# Patient Record
Sex: MALE | Race: WHITE
[De-identification: names, ages, dates, MRNs, and addresses within clinical notes are randomized per-mention and may not be internally consistent; named-entity substitution may affect disease eponyms.]

---

## 2018-10-20 ENCOUNTER — HOSPITAL ENCOUNTER (EMERGENCY)
Dept: HOSPITAL 63 - ER | Age: 73
Discharge: HOME | End: 2018-10-20
Payer: MEDICARE

## 2018-10-20 VITALS — HEIGHT: 73 IN | WEIGHT: 165 LBS | BODY MASS INDEX: 21.87 KG/M2

## 2018-10-20 VITALS — DIASTOLIC BLOOD PRESSURE: 81 MMHG | SYSTOLIC BLOOD PRESSURE: 153 MMHG
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
## 2018-10-20 DIAGNOSIS — S61.111A: Primary | ICD-10-CM
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
## 2018-10-20 PROCEDURE — 90715 TDAP VACCINE 7 YRS/> IM: CPT

## 2018-10-20 PROCEDURE — 12001 RPR S/N/AX/GEN/TRNK 2.5CM/<: CPT

## 2018-10-20 PROCEDURE — 73140 X-RAY EXAM OF FINGER(S): CPT

## 2018-10-20 PROCEDURE — 90471 IMMUNIZATION ADMIN: CPT

## 2018-10-20 NOTE — RAD
FINGER(S) RIGHT (hand PA and lateral, first digit oblique)

 

INDICATION:  right thumb injury, cut thumb working around the house

 

COMPARISON:  None.

 

FINDINGS:  

 

No acute fracture or malalignment. Advanced first carpometacarpal 

arthrosis. Triangular fibrocartilage complex chondrocalcinosis. Bony 

mineralization is normal for the patient's age. No significant soft tissue

abnormality. Fourth digit jewelry. No other radiopaque foreign body.

 

IMPRESSION:  

1. No acute fracture or malalignment.

2. Fourth digit jewelry. No other radiopaque foreign body.

3. Advanced first carpometacarpal arthrosis.

4. Triangular fibrocartilage complex chondrocalcinosis which can be seen 

with CPPD.

 

Electronically signed by: Jelani Oliver MD (10/20/2018 1:03 PM) 

[facility]

## 2018-10-20 NOTE — PHYS DOC
Past History


Past Medical History:  No Pertinent History


Past Surgical History:  No Surgical History


Smoking:  Non-smoker


Alcohol Use:  Rarely


Drug Use:  None





Adult General


Chief Complaint


Chief Complaint:  FINGER INJURY





HPI


HPI





Patient is a 73 year old right-handed male who presents with complaining of 

injury to right thumb. Patient states he was fixing his garage door and 

discussing in his right thumb laceration and bleeding of the right thumb. 

Patient denies other injuries and focal neuro deficit. Last tetanus 

immunization is unknown.





Review of Systems


Review of Systems





Constitutional: Denies fever or chills []


Eyes: Denies change in visual acuity, redness, or eye pain []


HENT: Denies nasal congestion or sore throat []


Respiratory: Denies cough or shortness of breath []


Cardiovascular: No additional information not addressed in HPI []


GI: Denies abdominal pain, nausea, vomiting, bloody stools or diarrhea []


: Denies dysuria or hematuria []


Musculoskeletal: Denies back pain, reports joint pain []


Integument: Denies rash or skin lesions []


Neurologic: Denies headache, focal weakness or sensory changes []


Endocrine: Denies polyuria or polydipsia []





All other systems were reviewed and found to be within normal limits, except as 

documented in this note.





Current Medications


Current Medications





Current Medications








 Medications


  (Trade)  Dose


 Ordered  Sig/Yaritza  Start Time


 Stop Time Status Last Admin


Dose Admin


 


 Diphtheria/


 Tetanus/Acell


 Pertussis


  (Boostrix)  0.5 ml  ONCE ONCE  10/20/18 13:15


 10/20/18 13:16 UNV  














Physical Exam


Physical Exam





Constitutional: Well developed, well nourished, mild distress, non-toxic 

appearance. []


HENT: Normocephalic, atraumatic.


Eyes: PERRLA, EOMI, conjunctiva normal, no discharge. [] 


Neck: Normal range of motion, no tenderness, supple, no stridor. [] 


Cardiovascular:Heart rate regular rhythm, no murmur []


Lungs & Thorax:  Bilateral breath sounds clear to auscultation []


Back: No tenderness, no CVA tenderness. [] 


Extremities: Right thumb with 1 cm longitudinal laceration of distal phalanx 

dorsal side with extension to need with subungual hematoma without 

neurovascular deficit,mild tenderness, no cyanosis, no clubbing, ROM intact, no 

edema. [] 


Neurologic: Alert and oriented X 3, normal motor function, normal sensory 

function, no focal deficits noted. []


Psychologic: Affect normal, judgement normal, mood normal. []





Current Patient Data


Vital Signs





 Vital Signs








  Date Time  Temp Pulse Resp B/P (MAP) Pulse Ox O2 Delivery O2 Flow Rate FiO2


 


10/20/18 12:33 98.4 68 16  98 Room Air  











EKG


EKG


[]





Radiology/Procedures


Radiology/Procedures


 [facility] [address], [place] [address]


 [phone]


 


 IMAGING REPORT





 Signed





PATIENT: NICKY GALO ACCOUNT: [id number] MRN#: [id number]


: 1945 LOCATION: ER AGE: 73


SEX: M EXAM DT: 10/20/18 ACCESSION#: [id number]


STATUS: REG ER ORD. PHYSICIAN: LUH PALMA MD 


REASON: thumb injury


PROCEDURE: FINGER(S) RIGHT





 


FINGER(S) RIGHT (hand PA and lateral, first digit oblique)


 


INDICATION:  right thumb injury, cut thumb working around the house


 


COMPARISON:  None.


 


FINDINGS:  


 


No acute fracture or malalignment. Advanced first carpometacarpal 


arthrosis. Triangular fibrocartilage complex chondrocalcinosis. Bony 


mineralization is normal for the patient's age. No significant soft tissue


abnormality. Fourth digit jewelry. No other radiopaque foreign body.


 


IMPRESSION:  


1. No acute fracture or malalignment.


2. Fourth digit jewelry. No other radiopaque foreign body.


3. Advanced first carpometacarpal arthrosis.


4. Triangular fibrocartilage complex chondrocalcinosis which can be seen 


with CPPD.


 


Electronically signed by: Jelani Oliver MD (10/20/2018 1:03 PM) 


[facility]














DICTATED AND SIGNED BY:     JELANI OLIVER MD


DATE:     10/20/18 9009





CC: LUH PALMA MD; MARQUEZ MENJIVAR MD ~





Course & Med Decision Making


Course & Med Decision Making


Pertinent  Imaging studies reviewed. (See chart for details)





discharge:





I've spoken with the patient and/or caregivers. I've explained the patient's 

condition, diagnosis and treatment plan based on information available to me at 

this time. I've answered the patient's and/or caregivers questions and 

addressed any concerns. The patient and/or caregivers have a good understanding 

the patient's diagnosis, condition and treatment plan as can be expected at 

this point. Vital signs have been stabilized. The patient's condition is stable 

for discharge from the emergency department.





The patient will pursue further outpatient evaluation with her primary care 

provider or other designated consulting physician as outlined in the discharge 

instructions. Patient and/or caregivers are agreeable to this plan of care and 

follow-up instructions have been explained in detail. The patient and/or 

caregivers have received these instructions in written format and expressed 

understanding of these discharge instructions. The patient and her caregivers 

are aware that if any significant change in condition or worsening of symptoms 

should prompt him to immediately return to this of the closest emergency 

department.  If an emergent department is not readily available I would 

encourage him to call 911.





Dragon Disclaimer


Dragon Disclaimer


This electronic medical record was generated, in whole or in part, using a 

voice recognition dictation system.





Laceration Repair


Lac Repair


Indication: Right thumb laceration 


Procedure: The patient was placed in the appropriate position after cleaning 

the 1 cm laceration of right thumb was repaired with Dermabond and Steri-Strip, 

aluminium foam splint and Coban dressing was applied.


Total repaired wound length: 1 cm 





Other Items: [OTHER ITEMS]





The patient tolerated the procedure well.





Complications: None.





Departure


Departure:


Impression:  


 Primary Impression:  


 Laceration of right thumb


 Additional Impression:  


 Subungual hematoma of finger of right hand


Disposition:  01 HOME, SELF-CARE (at 1322)


Condition:  IMPROVED


Referrals:  


MARQUEZ MENJIVAR MD (PCP)


Patient Instructions:  Subungual Hematoma, Tissue Adhesive Wound Care, Tissue 

Adhesive Wound Care, Easy-to-Read





Additional Instructions:  


Keep wound clean and dry


Follow-up with your primary care physician in 3-5 days


Return to ER if not getting better





Problem Qualifiers











LUH PALMA MD Oct 20, 2018 13:23